# Patient Record
Sex: FEMALE | Race: WHITE | NOT HISPANIC OR LATINO | URBAN - METROPOLITAN AREA
[De-identification: names, ages, dates, MRNs, and addresses within clinical notes are randomized per-mention and may not be internally consistent; named-entity substitution may affect disease eponyms.]

---

## 2023-07-10 ENCOUNTER — APPOINTMENT (OUTPATIENT)
Age: 18
Setting detail: DERMATOLOGY
End: 2023-07-11

## 2023-07-10 VITALS — TEMPERATURE: 98.7 F

## 2023-07-10 DIAGNOSIS — L72.8 OTHER FOLLICULAR CYSTS OF THE SKIN AND SUBCUTANEOUS TISSUE: ICD-10-CM

## 2023-07-10 DIAGNOSIS — T30.4 CORROSION OF UNSPECIFIED BODY REGION, UNSPECIFIED DEGREE: ICD-10-CM

## 2023-07-10 DIAGNOSIS — L0390 CELLULITIS AND ABSCESS OF UNSPECIFIED SITES: ICD-10-CM

## 2023-07-10 DIAGNOSIS — L0391 CELLULITIS AND ABSCESS OF UNSPECIFIED SITES: ICD-10-CM

## 2023-07-10 DIAGNOSIS — D22 MELANOCYTIC NEVI: ICD-10-CM

## 2023-07-10 PROBLEM — D22.5 MELANOCYTIC NEVI OF TRUNK: Status: ACTIVE | Noted: 2023-07-10

## 2023-07-10 PROBLEM — L02.412 CUTANEOUS ABSCESS OF LEFT AXILLA: Status: ACTIVE | Noted: 2023-07-10

## 2023-07-10 PROBLEM — L02.411 CUTANEOUS ABSCESS OF RIGHT AXILLA: Status: ACTIVE | Noted: 2023-07-10

## 2023-07-10 PROBLEM — D22.61 MELANOCYTIC NEVI OF RIGHT UPPER LIMB, INCLUDING SHOULDER: Status: ACTIVE | Noted: 2023-07-10

## 2023-07-10 PROCEDURE — OTHER PRESCRIPTION: OTHER

## 2023-07-10 PROCEDURE — OTHER MIPS QUALITY: OTHER

## 2023-07-10 PROCEDURE — OTHER PRESCRIPTION MEDICATION MANAGEMENT: OTHER

## 2023-07-10 PROCEDURE — OTHER COUNSELING: OTHER

## 2023-07-10 PROCEDURE — OTHER ADDITIONAL NOTES: OTHER

## 2023-07-10 PROCEDURE — 99203 OFFICE O/P NEW LOW 30 MIN: CPT

## 2023-07-10 RX ORDER — CLINDAMYCIN PHOSPHATE 10 MG/ML
SOLUTION TOPICAL
Qty: 30 | Refills: 4 | Status: ERX | COMMUNITY
Start: 2023-07-10

## 2023-07-10 ASSESSMENT — LOCATION ZONE DERM
LOCATION ZONE: TRUNK
LOCATION ZONE: ARM
LOCATION ZONE: AXILLAE

## 2023-07-10 ASSESSMENT — LOCATION DETAILED DESCRIPTION DERM
LOCATION DETAILED: RIGHT SUPERIOR UPPER BACK
LOCATION DETAILED: RIGHT RIB CAGE
LOCATION DETAILED: RIGHT AXILLARY VAULT
LOCATION DETAILED: LEFT AXILLARY VAULT
LOCATION DETAILED: LEFT INFERIOR MEDIAL UPPER BACK
LOCATION DETAILED: LEFT RIB CAGE
LOCATION DETAILED: RIGHT POSTERIOR SHOULDER

## 2023-07-10 ASSESSMENT — LOCATION SIMPLE DESCRIPTION DERM
LOCATION SIMPLE: LEFT UPPER BACK
LOCATION SIMPLE: RIGHT SHOULDER
LOCATION SIMPLE: RIGHT UPPER BACK
LOCATION SIMPLE: ABDOMEN
LOCATION SIMPLE: RIGHT AXILLARY VAULT
LOCATION SIMPLE: LEFT AXILLARY VAULT

## 2023-07-10 NOTE — PROCEDURE: PRESCRIPTION MEDICATION MANAGEMENT
Render In Strict Bullet Format?: No
Detail Level: Zone
Initiate Treatment: OTC Hibiclens and Panoxyl BPO wash (alternating) - allow to sit on skin for at least five minutes prior to rinsing; clindamycin phosphate 1 % topical solution - apply daily or twice daily as needed to affected areas under arms PRN flares
Initiate Treatment: Aquaphor/ Vaseline ointment QD to affected areas under arms until healed
Initiate Treatment: Panoxyl 10% cleanser- lather to affected areas under arms, let sit for 5 mins then rinse , use 3-5x a week\\nHibiclens cleanser - lather to affected areas under arms daily

## 2023-07-10 NOTE — PROCEDURE: ADDITIONAL NOTES
Additional Notes: Patient describes two incidences of painful lumps forming under the skin of her left armpit over the past year - the most recent occurred in March 2023. Both were drained and have since resolved entirely. Patient does not have any evidence of cysts or HS-like acneiform nodules - only an incision scar from the outbreak in March. We discussed the likelihood that what patient experienced were abscesses, and I recommended that patient take some precautions prior to shaving to decrease the bacterial load in the area.
Detail Level: Simple
Render Risk Assessment In Note?: no